# Patient Record
Sex: FEMALE | Race: OTHER | HISPANIC OR LATINO
[De-identification: names, ages, dates, MRNs, and addresses within clinical notes are randomized per-mention and may not be internally consistent; named-entity substitution may affect disease eponyms.]

---

## 2021-04-08 ENCOUNTER — APPOINTMENT (OUTPATIENT)
Dept: INTERNAL MEDICINE | Facility: CLINIC | Age: 41
End: 2021-04-08
Payer: MEDICAID

## 2021-04-08 ENCOUNTER — LABORATORY RESULT (OUTPATIENT)
Age: 41
End: 2021-04-08

## 2021-04-08 VITALS
TEMPERATURE: 98 F | WEIGHT: 136 LBS | DIASTOLIC BLOOD PRESSURE: 71 MMHG | RESPIRATION RATE: 16 BRPM | HEART RATE: 65 BPM | OXYGEN SATURATION: 97 % | SYSTOLIC BLOOD PRESSURE: 106 MMHG | BODY MASS INDEX: 27.42 KG/M2 | HEIGHT: 59 IN

## 2021-04-08 DIAGNOSIS — Z83.49 FAMILY HISTORY OF OTHER ENDOCRINE, NUTRITIONAL AND METABOLIC DISEASES: ICD-10-CM

## 2021-04-08 DIAGNOSIS — Z83.3 FAMILY HISTORY OF DIABETES MELLITUS: ICD-10-CM

## 2021-04-08 DIAGNOSIS — Z12.4 ENCOUNTER FOR SCREENING FOR MALIGNANT NEOPLASM OF CERVIX: ICD-10-CM

## 2021-04-08 DIAGNOSIS — Z78.9 OTHER SPECIFIED HEALTH STATUS: ICD-10-CM

## 2021-04-08 DIAGNOSIS — J30.2 OTHER SEASONAL ALLERGIC RHINITIS: ICD-10-CM

## 2021-04-08 PROCEDURE — 99204 OFFICE O/P NEW MOD 45 MIN: CPT

## 2021-04-08 PROCEDURE — 99072 ADDL SUPL MATRL&STAF TM PHE: CPT

## 2021-04-08 NOTE — HISTORY OF PRESENT ILLNESS
[FreeTextEntry1] : establish care [de-identified] : 40yoF,  applying for [facility], PMH LUTS, seasonal allergies presents to establish care\par \par urology - s/p electrostimulation\par \par ENT - allergy to dust\par otherwise workup wnl

## 2021-04-08 NOTE — HEALTH RISK ASSESSMENT
[] : No [No] : No [0] : 2) Feeling down, depressed, or hopeless: Not at all (0) [[id number]] : 0 [Patient reported PAP Smear was normal] : Patient reported PAP Smear was normal [PapSmearDate] : 2000 [PapSmearComments] : due

## 2021-04-09 ENCOUNTER — NON-APPOINTMENT (OUTPATIENT)
Age: 41
End: 2021-04-09

## 2021-04-13 LAB
ALBUMIN SERPL ELPH-MCNC: 4.2 G/DL
ALP BLD-CCNC: 82 U/L
ALT SERPL-CCNC: 18 U/L
ANION GAP SERPL CALC-SCNC: 11 MMOL/L
AST SERPL-CCNC: 28 U/L
BASOPHILS # BLD AUTO: 0.04 K/UL
BASOPHILS NFR BLD AUTO: 0.6 %
BILIRUB SERPL-MCNC: 0.3 MG/DL
BUN SERPL-MCNC: 10 MG/DL
CALCIUM SERPL-MCNC: 9.3 MG/DL
CHLORIDE SERPL-SCNC: 101 MMOL/L
CHOLEST SERPL-MCNC: 191 MG/DL
CO2 SERPL-SCNC: 27 MMOL/L
CREAT SERPL-MCNC: 0.78 MG/DL
EOSINOPHIL # BLD AUTO: 0.23 K/UL
EOSINOPHIL NFR BLD AUTO: 3.6 %
ESTIMATED AVERAGE GLUCOSE: 111 MG/DL
GLUCOSE SERPL-MCNC: 86 MG/DL
HBA1C MFR BLD HPLC: 5.5 %
HCT VFR BLD CALC: 37.3 %
HDLC SERPL-MCNC: 71 MG/DL
HGB BLD-MCNC: 11.8 G/DL
IMM GRANULOCYTES NFR BLD AUTO: 0.2 %
LDLC SERPL CALC-MCNC: 108 MG/DL
LYMPHOCYTES # BLD AUTO: 1.76 K/UL
LYMPHOCYTES NFR BLD AUTO: 27.2 %
MAN DIFF?: NORMAL
MCHC RBC-ENTMCNC: 30.6 PG
MCHC RBC-ENTMCNC: 31.6 GM/DL
MCV RBC AUTO: 96.9 FL
MONOCYTES # BLD AUTO: 0.4 K/UL
MONOCYTES NFR BLD AUTO: 6.2 %
NEUTROPHILS # BLD AUTO: 4.02 K/UL
NEUTROPHILS NFR BLD AUTO: 62.2 %
NONHDLC SERPL-MCNC: 120 MG/DL
PLATELET # BLD AUTO: 324 K/UL
POTASSIUM SERPL-SCNC: 4.5 MMOL/L
PROT SERPL-MCNC: 7.1 G/DL
RBC # BLD: 3.85 M/UL
RBC # FLD: 12.9 %
SODIUM SERPL-SCNC: 138 MMOL/L
THYROGLOB AB SERPL-ACNC: <20 IU/ML
THYROPEROXIDASE AB SERPL IA-ACNC: 971 IU/ML
TRIGL SERPL-MCNC: 62 MG/DL
TSH SERPL-ACNC: 5.84 UIU/ML
WBC # FLD AUTO: 6.46 K/UL

## 2021-04-22 ENCOUNTER — TRANSCRIPTION ENCOUNTER (OUTPATIENT)
Age: 41
End: 2021-04-22

## 2021-05-25 ENCOUNTER — APPOINTMENT (OUTPATIENT)
Dept: OBGYN | Facility: CLINIC | Age: 41
End: 2021-05-25
Payer: MEDICAID

## 2021-05-25 VITALS
TEMPERATURE: 97.9 F | WEIGHT: 134 LBS | BODY MASS INDEX: 27.01 KG/M2 | HEART RATE: 66 BPM | DIASTOLIC BLOOD PRESSURE: 51 MMHG | HEIGHT: 59 IN | SYSTOLIC BLOOD PRESSURE: 95 MMHG | OXYGEN SATURATION: 99 %

## 2021-05-25 PROCEDURE — 99385 PREV VISIT NEW AGE 18-39: CPT

## 2021-05-25 NOTE — HISTORY OF PRESENT ILLNESS
[FreeTextEntry1] : here for annual\par no complaints \par not sexually active currently \par reg menses \par

## 2021-05-27 LAB
C TRACH RRNA SPEC QL NAA+PROBE: NOT DETECTED
HPV HIGH+LOW RISK DNA PNL CVX: NOT DETECTED
N GONORRHOEA RRNA SPEC QL NAA+PROBE: NOT DETECTED
SOURCE TP AMPLIFICATION: NORMAL

## 2021-05-28 LAB — CYTOLOGY CVX/VAG DOC THIN PREP: ABNORMAL

## 2021-06-04 ENCOUNTER — APPOINTMENT (OUTPATIENT)
Dept: INTERNAL MEDICINE | Facility: CLINIC | Age: 41
End: 2021-06-04
Payer: MEDICAID

## 2021-06-04 VITALS
OXYGEN SATURATION: 96 % | HEART RATE: 77 BPM | RESPIRATION RATE: 16 BRPM | BODY MASS INDEX: 27.01 KG/M2 | HEIGHT: 59 IN | SYSTOLIC BLOOD PRESSURE: 111 MMHG | DIASTOLIC BLOOD PRESSURE: 68 MMHG | TEMPERATURE: 98 F | WEIGHT: 134 LBS

## 2021-06-04 PROCEDURE — 99214 OFFICE O/P EST MOD 30 MIN: CPT

## 2021-06-05 NOTE — PHYSICAL EXAM
[No Acute Distress] : no acute distress [Well Nourished] : well nourished [Well Developed] : well developed [Well-Appearing] : well-appearing [Normal Sclera/Conjunctiva] : normal sclera/conjunctiva [PERRL] : pupils equal round and reactive to light [EOMI] : extraocular movements intact [Normal Outer Ear/Nose] : the outer ears and nose were normal in appearance [Normal Oropharynx] : the oropharynx was normal [No JVD] : no jugular venous distention [No Lymphadenopathy] : no lymphadenopathy [Supple] : supple [Thyroid Normal, No Nodules] : the thyroid was normal and there were no nodules present [No Accessory Muscle Use] : no accessory muscle use [No Respiratory Distress] : no respiratory distress  [Clear to Auscultation] : lungs were clear to auscultation bilaterally [Normal Rate] : normal rate  [Regular Rhythm] : with a regular rhythm [Normal S1, S2] : normal S1 and S2 [No Murmur] : no murmur heard [No Carotid Bruits] : no carotid bruits [No Abdominal Bruit] : a ~M bruit was not heard ~T in the abdomen [No Varicosities] : no varicosities [No Edema] : there was no peripheral edema [Pedal Pulses Present] : the pedal pulses are present [No Palpable Aorta] : no palpable aorta [No Extremity Clubbing/Cyanosis] : no extremity clubbing/cyanosis [Soft] : abdomen soft [Non Tender] : non-tender [Non-distended] : non-distended [No Masses] : no abdominal mass palpated [No HSM] : no HSM [Normal Bowel Sounds] : normal bowel sounds [Normal Posterior Cervical Nodes] : no posterior cervical lymphadenopathy [Normal Anterior Cervical Nodes] : no anterior cervical lymphadenopathy [No CVA Tenderness] : no CVA  tenderness [No Spinal Tenderness] : no spinal tenderness [No Joint Swelling] : no joint swelling [Grossly Normal Strength/Tone] : grossly normal strength/tone [No Rash] : no rash [Coordination Grossly Intact] : coordination grossly intact [No Focal Deficits] : no focal deficits [Normal Gait] : normal gait [Deep Tendon Reflexes (DTR)] : deep tendon reflexes were 2+ and symmetric [Normal Affect] : the affect was normal [Normal Insight/Judgement] : insight and judgment were intact

## 2021-06-05 NOTE — HISTORY OF PRESENT ILLNESS
[FreeTextEntry1] : thyroid [de-identified] : 40yoF, , PMH hypothyroidism, LUTS, seasonal allergies presents for follow up\par \par unable to apply for [facility] due to hypothyroidism\par \par adherent to synthroid\par endorses resolution b/l wrist pain

## 2021-06-07 ENCOUNTER — TRANSCRIPTION ENCOUNTER (OUTPATIENT)
Age: 41
End: 2021-06-07

## 2021-06-07 LAB
T4 FREE SERPL-MCNC: 0.9 NG/DL
TSH SERPL-ACNC: 3.13 UIU/ML

## 2021-08-05 ENCOUNTER — LABORATORY RESULT (OUTPATIENT)
Age: 41
End: 2021-08-05

## 2021-08-05 ENCOUNTER — APPOINTMENT (OUTPATIENT)
Dept: INTERNAL MEDICINE | Facility: CLINIC | Age: 41
End: 2021-08-05
Payer: MEDICAID

## 2021-08-05 VITALS
WEIGHT: 135 LBS | OXYGEN SATURATION: 99 % | RESPIRATION RATE: 16 BRPM | BODY MASS INDEX: 27.21 KG/M2 | SYSTOLIC BLOOD PRESSURE: 99 MMHG | TEMPERATURE: 97.9 F | HEART RATE: 65 BPM | HEIGHT: 59 IN | DIASTOLIC BLOOD PRESSURE: 66 MMHG

## 2021-08-05 DIAGNOSIS — F41.9 ANXIETY DISORDER, UNSPECIFIED: ICD-10-CM

## 2021-08-05 PROCEDURE — 99214 OFFICE O/P EST MOD 30 MIN: CPT

## 2021-08-05 NOTE — HISTORY OF PRESENT ILLNESS
[FreeTextEntry1] : hypothyroidism [de-identified] : 41yoF, , PMH hypothyroidism, LUTS, seasonal allergies presents for follow up\par \par a lot of stress as single mother\par does not have employable skill\par \par last saw psychiatrist 2 mos ago\par had been on SSRI but stopped

## 2021-08-10 LAB — TSH SERPL-ACNC: 5.41 UIU/ML

## 2021-11-16 LAB — TSH SERPL-ACNC: 3.96 UIU/ML

## 2021-12-03 ENCOUNTER — APPOINTMENT (OUTPATIENT)
Dept: ENDOCRINOLOGY | Facility: CLINIC | Age: 41
End: 2021-12-03
Payer: MEDICAID

## 2021-12-03 VITALS
DIASTOLIC BLOOD PRESSURE: 54 MMHG | OXYGEN SATURATION: 98 % | WEIGHT: 135 LBS | BODY MASS INDEX: 25.82 KG/M2 | HEART RATE: 75 BPM | SYSTOLIC BLOOD PRESSURE: 89 MMHG | HEIGHT: 60.63 IN | TEMPERATURE: 98.9 F

## 2021-12-03 PROCEDURE — 99203 OFFICE O/P NEW LOW 30 MIN: CPT

## 2021-12-06 NOTE — ASSESSMENT
[FreeTextEntry1] : This is a 41-year-old female with primary hypothyroidism, Hashimoto's thyroiditis, here for consultation.\par She was diagnosed with hypothyroidism approximately age 40.\par She is currently on levothyroxine 25 mcg daily.\par She is not currently trying to conceive.\par Thyroid ultrasound from July 2021 showed no thyroid nodules.\par TSH from November 2021 was 3.96.  Continue levothyroxine 25 mcg daily.\par She is clinically euthyroid.\par

## 2021-12-06 NOTE — PHYSICAL EXAM
[Alert] : alert [Well Nourished] : well nourished [Healthy Appearance] : healthy appearance [No Acute Distress] : no acute distress [Well Developed] : well developed [Normal Voice/Communication] : normal voice communication [Normal Sclera/Conjunctiva] : normal sclera/conjunctiva [No Proptosis] : no proptosis [No Neck Mass] : no neck mass was observed [No LAD] : no lymphadenopathy [Supple] : the neck was supple [Thyroid Not Enlarged] : the thyroid was not enlarged [No Thyroid Nodules] : no palpable thyroid nodules [No Respiratory Distress] : no respiratory distress [Normal Rate] : heart rate was normal [Normal Affect] : the affect was normal [Normal Insight/Judgement] : insight and judgment were intact [Normal Mood] : the mood was normal

## 2021-12-06 NOTE — HISTORY OF PRESENT ILLNESS
[FreeTextEntry1] : CC: Hypothyroidism\par This is a 41-year-old female with primary hypothyroidism, Hashimoto's thyroiditis, here for consultation.\par She was diagnosed with hypothyroidism approximately age 40.\par She is currently on levothyroxine 25 mcg daily.\par She is not currently trying to conceive.\par Thyroid ultrasound from July 2021 showed no thyroid nodules.\par TSH from November 2021 was normal.\par

## 2021-12-17 ENCOUNTER — NON-APPOINTMENT (OUTPATIENT)
Age: 41
End: 2021-12-17

## 2021-12-18 ENCOUNTER — APPOINTMENT (OUTPATIENT)
Dept: INTERNAL MEDICINE | Facility: CLINIC | Age: 41
End: 2021-12-18
Payer: MEDICAID

## 2021-12-18 VITALS
TEMPERATURE: 98 F | SYSTOLIC BLOOD PRESSURE: 99 MMHG | BODY MASS INDEX: 25.52 KG/M2 | DIASTOLIC BLOOD PRESSURE: 62 MMHG | OXYGEN SATURATION: 97 % | HEART RATE: 76 BPM | RESPIRATION RATE: 16 BRPM | HEIGHT: 60 IN | WEIGHT: 130 LBS

## 2021-12-18 DIAGNOSIS — L25.9 UNSPECIFIED CONTACT DERMATITIS, UNSPECIFIED CAUSE: ICD-10-CM

## 2021-12-18 PROCEDURE — G0008: CPT

## 2021-12-18 PROCEDURE — 90686 IIV4 VACC NO PRSV 0.5 ML IM: CPT

## 2021-12-18 PROCEDURE — 99214 OFFICE O/P EST MOD 30 MIN: CPT | Mod: 25

## 2021-12-18 RX ORDER — OXYBUTYNIN CHLORIDE 5 MG/1
5 TABLET, EXTENDED RELEASE ORAL
Qty: 30 | Refills: 0 | Status: DISCONTINUED | COMMUNITY
Start: 2021-10-11 | End: 2021-12-18

## 2021-12-18 RX ORDER — L.ACID,FERM,PLA,RHA/B.BIF,LONG 126 MG
TABLET, DELAYED AND EXTENDED RELEASE ORAL
Refills: 0 | Status: ACTIVE | COMMUNITY
Start: 2021-12-18

## 2021-12-18 NOTE — HISTORY OF PRESENT ILLNESS
[FreeTextEntry1] : hypothyroidism  [de-identified] : 41yoF, , PMH hypothyroidism, LUTS, seasonal allergies presents for follow up\par \par s/p urethral neuromodulation at [facility]\par urinary incontinence resolved\par \par feels better, less anxiety\par helping daughter navigate social anxiety\par \par reviewed recent labs - TSH within range\par \par endorses itchy rash left inner thigh after using urinary pads\par endorses 'pimple' near vagina - no history genital herpes - will f/u GYN for exam

## 2021-12-31 NOTE — REVIEW OF SYSTEMS
Subjective     Jocelyn Kamara is a 48 y.o. female who presents with Dysuria (x2-3 days, possible UTI, burning with urination ) and Urinary Frequency (going all the time )            Dysuria   This is a new problem. Episode onset: pt reports new onset of burning with urination and sense of urinary urgency that started 2 days ago. No fever or chills. No abd pain. The problem has been gradually worsening. The quality of the pain is described as burning. Associated symptoms include urgency. Pertinent negatives include no flank pain. She has tried increased fluids for the symptoms. The treatment provided no relief.       Review of Systems   Genitourinary: Positive for dysuria and urgency. Negative for flank pain.   All other systems reviewed and are negative.         Past Medical History:   Diagnosis Date   • Myofascial pain       Past Surgical History:   Procedure Laterality Date   • OVARIAN CYSTECTOMY     • PRIMARY C SECTION        Social History     Socioeconomic History   • Marital status: Unknown     Spouse name: Not on file   • Number of children: Not on file   • Years of education: Not on file   • Highest education level: Not on file   Occupational History   • Not on file   Tobacco Use   • Smoking status: Never Smoker   • Smokeless tobacco: Never Used   Vaping Use   • Vaping Use: Never used   Substance and Sexual Activity   • Alcohol use: Yes     Comment: rarely    • Drug use: Never   • Sexual activity: Not on file   Other Topics Concern   • Not on file   Social History Narrative   • Not on file     Social Determinants of Health     Financial Resource Strain:    • Difficulty of Paying Living Expenses: Not on file   Food Insecurity:    • Worried About Running Out of Food in the Last Year: Not on file   • Ran Out of Food in the Last Year: Not on file   Transportation Needs:    • Lack of Transportation (Medical): Not on file   • Lack of Transportation (Non-Medical): Not on file   Physical Activity:    •  "Days of Exercise per Week: Not on file   • Minutes of Exercise per Session: Not on file   Stress:    • Feeling of Stress : Not on file   Social Connections:    • Frequency of Communication with Friends and Family: Not on file   • Frequency of Social Gatherings with Friends and Family: Not on file   • Attends [employer] Services: Not on file   • Active Member of Clubs or Organizations: Not on file   • Attends Club or Organization Meetings: Not on file   • Marital Status: Not on file   Intimate Partner Violence:    • Fear of Current or Ex-Partner: Not on file   • Emotionally Abused: Not on file   • Physically Abused: Not on file   • Sexually Abused: Not on file   Housing Stability:    • Unable to Pay for Housing in the Last Year: Not on file   • Number of Places Lived in the Last Year: Not on file   • Unstable Housing in the Last Year: Not on file         Objective     /58   Pulse 77   Temp 36.2 °C (97.2 °F) (Temporal)   Resp 16   Ht 1.6 m (5' 3\")   Wt 54.4 kg (120 lb)   LMP 12/09/2021 (Approximate)   SpO2 99%   Breastfeeding No   BMI 21.26 kg/m²      Physical Exam  Vitals and nursing note reviewed.   Constitutional:       Appearance: Normal appearance. She is normal weight.   HENT:      Head: Normocephalic and atraumatic.      Nose: Nose normal.      Mouth/Throat:      Mouth: Mucous membranes are moist.      Pharynx: Oropharynx is clear.   Eyes:      Extraocular Movements: Extraocular movements intact.      Pupils: Pupils are equal, round, and reactive to light.   Cardiovascular:      Rate and Rhythm: Normal rate and regular rhythm.   Pulmonary:      Effort: Pulmonary effort is normal.   Abdominal:      Tenderness: There is no right CVA tenderness or left CVA tenderness.   Musculoskeletal:         General: Normal range of motion.      Cervical back: Normal range of motion.   Skin:     General: Skin is warm and dry.      Capillary Refill: Capillary refill takes less than 2 seconds.   Neurological:      " General: No focal deficit present.      Mental Status: She is alert and oriented to person, place, and time. Mental status is at baseline.   Psychiatric:         Mood and Affect: Mood normal.         Speech: Speech normal.         Thought Content: Thought content normal.         Judgment: Judgment normal.                  Lab Results   Component Value Date/Time    POCCOLOR Orange 12/31/2021 11:27 AM    POCAPPEAR Clear 12/31/2021 11:27 AM    POCLEUKEST Neg 12/31/2021 11:27 AM    POCNITRITE Neg 12/31/2021 11:27 AM    POCUROBILIGE 0.2 12/31/2021 11:27 AM    POCPROTEIN Neg 12/31/2021 11:27 AM    POCURPH 7.0 12/31/2021 11:27 AM    POCBLOOD Small 12/31/2021 11:27 AM    POCSPGRV 1.020 12/31/2021 11:27 AM    POCKETONES Neg 12/31/2021 11:27 AM    POCBILIRUBIN Neg 12/31/2021 11:27 AM    POCGLUCUA Neg 12/31/2021 11:27 AM                 Assessment & Plan        1. Dysuria  - POCT Urinalysis    2. Urinary tract infection with hematuria, site unspecified  - Urine Culture; Future  - nitrofurantoin (MACROBID) 100 MG Cap; Take 1 Capsule by mouth every 12 hours for 5 days.  Dispense: 10 Capsule; Refill: 0  - phenazopyridine (PYRIDIUM) 200 MG Tab; Take 1 Tablet by mouth 3 times a day for 2 days.  Dispense: 6 Tablet; Refill: 0    Take full course of abx  Will contact her if I need to change abx based on cx results  Increase water intake  Tylenol and ibuprofen as needed for pain  Supportive care, differential diagnoses, and indications for immediate follow-up discussed with patient.    Pathogenesis of diagnosis discussed including typical length and natural progression.      Instructed to return to  or nearest emergency department if symptoms fail to improve, for any change in condition, further concerns, or new concerning symptoms.  Patient states understanding of the plan of care and discharge instructions.               [Negative] : Heme/Lymph

## 2022-02-08 ENCOUNTER — APPOINTMENT (OUTPATIENT)
Dept: OBGYN | Facility: CLINIC | Age: 42
End: 2022-02-08
Payer: MEDICAID

## 2022-02-08 VITALS
TEMPERATURE: 98 F | HEIGHT: 60 IN | OXYGEN SATURATION: 98 % | WEIGHT: 134 LBS | DIASTOLIC BLOOD PRESSURE: 63 MMHG | BODY MASS INDEX: 26.31 KG/M2 | HEART RATE: 69 BPM | SYSTOLIC BLOOD PRESSURE: 98 MMHG

## 2022-02-08 PROCEDURE — 99214 OFFICE O/P EST MOD 30 MIN: CPT

## 2022-03-15 ENCOUNTER — NON-APPOINTMENT (OUTPATIENT)
Age: 42
End: 2022-03-15

## 2022-04-16 ENCOUNTER — APPOINTMENT (OUTPATIENT)
Dept: INTERNAL MEDICINE | Facility: CLINIC | Age: 42
End: 2022-04-16

## 2022-04-25 ENCOUNTER — LABORATORY RESULT (OUTPATIENT)
Age: 42
End: 2022-04-25

## 2022-05-03 ENCOUNTER — NON-APPOINTMENT (OUTPATIENT)
Age: 42
End: 2022-05-03

## 2022-05-03 LAB
ALBUMIN SERPL ELPH-MCNC: 4.1 G/DL
ALP BLD-CCNC: 81 U/L
ALT SERPL-CCNC: 20 U/L
ANION GAP SERPL CALC-SCNC: 12 MMOL/L
AST SERPL-CCNC: 25 U/L
BASOPHILS # BLD AUTO: 0.04 K/UL
BASOPHILS NFR BLD AUTO: 0.7 %
BILIRUB SERPL-MCNC: 0.2 MG/DL
BUN SERPL-MCNC: 12 MG/DL
CALCIUM SERPL-MCNC: 8.9 MG/DL
CHLORIDE SERPL-SCNC: 103 MMOL/L
CHOLEST SERPL-MCNC: 196 MG/DL
CO2 SERPL-SCNC: 25 MMOL/L
CREAT SERPL-MCNC: 0.68 MG/DL
EGFR: 113 ML/MIN/1.73M2
EOSINOPHIL # BLD AUTO: 0.23 K/UL
EOSINOPHIL NFR BLD AUTO: 4 %
ESTIMATED AVERAGE GLUCOSE: 120 MG/DL
GLUCOSE SERPL-MCNC: 90 MG/DL
HBA1C MFR BLD HPLC: 5.8 %
HCT VFR BLD CALC: 35.8 %
HDLC SERPL-MCNC: 76 MG/DL
HGB BLD-MCNC: 11.1 G/DL
IMM GRANULOCYTES NFR BLD AUTO: 0.3 %
LDLC SERPL CALC-MCNC: 109 MG/DL
LYMPHOCYTES # BLD AUTO: 1.65 K/UL
LYMPHOCYTES NFR BLD AUTO: 28.4 %
MAN DIFF?: NORMAL
MCHC RBC-ENTMCNC: 30.2 PG
MCHC RBC-ENTMCNC: 31 GM/DL
MCV RBC AUTO: 97.3 FL
MONOCYTES # BLD AUTO: 0.34 K/UL
MONOCYTES NFR BLD AUTO: 5.9 %
NEUTROPHILS # BLD AUTO: 3.53 K/UL
NEUTROPHILS NFR BLD AUTO: 60.7 %
NONHDLC SERPL-MCNC: 120 MG/DL
PLATELET # BLD AUTO: 315 K/UL
POTASSIUM SERPL-SCNC: 4.3 MMOL/L
PROT SERPL-MCNC: 6.9 G/DL
RBC # BLD: 3.68 M/UL
RBC # FLD: 13.3 %
SODIUM SERPL-SCNC: 139 MMOL/L
TRIGL SERPL-MCNC: 56 MG/DL
TSH SERPL-ACNC: 6.42 UIU/ML
WBC # FLD AUTO: 5.81 K/UL

## 2022-05-31 ENCOUNTER — APPOINTMENT (OUTPATIENT)
Dept: OBGYN | Facility: CLINIC | Age: 42
End: 2022-05-31
Payer: MEDICAID

## 2022-05-31 VITALS
TEMPERATURE: 98.1 F | HEIGHT: 60 IN | SYSTOLIC BLOOD PRESSURE: 102 MMHG | DIASTOLIC BLOOD PRESSURE: 66 MMHG | WEIGHT: 128 LBS | OXYGEN SATURATION: 98 % | HEART RATE: 72 BPM | BODY MASS INDEX: 25.13 KG/M2

## 2022-05-31 DIAGNOSIS — N89.8 OTHER SPECIFIED NONINFLAMMATORY DISORDERS OF VAGINA: ICD-10-CM

## 2022-05-31 DIAGNOSIS — Z01.419 ENCOUNTER FOR GYNECOLOGICAL EXAMINATION (GENERAL) (ROUTINE) W/OUT ABNORMAL FINDINGS: ICD-10-CM

## 2022-05-31 PROCEDURE — 99396 PREV VISIT EST AGE 40-64: CPT

## 2022-06-02 LAB
CANDIDA VAG CYTO: NOT DETECTED
G VAGINALIS+PREV SP MTYP VAG QL MICRO: DETECTED
T VAGINALIS VAG QL WET PREP: NOT DETECTED

## 2022-06-03 ENCOUNTER — APPOINTMENT (OUTPATIENT)
Dept: ENDOCRINOLOGY | Facility: CLINIC | Age: 42
End: 2022-06-03
Payer: MEDICAID

## 2022-06-03 DIAGNOSIS — E06.3 AUTOIMMUNE THYROIDITIS: ICD-10-CM

## 2022-06-03 PROCEDURE — 99212 OFFICE O/P EST SF 10 MIN: CPT | Mod: 95

## 2022-06-03 NOTE — REVIEW OF SYSTEMS
[Fatigue] : fatigue [Recent Weight Loss (___ Lbs)] : recent weight loss: [unfilled] lbs [Acne] : acne [All other systems negative] : All other systems negative

## 2022-06-03 NOTE — ASSESSMENT
[FreeTextEntry1] : This is a 41-year-old female with primary hypothyroidism, Hashimoto's thyroiditis, anemia, possible prediabetes.\par She was diagnosed with hypothyroidism approximately age 40.\par She is currently on levothyroxine 25 mcg daily.  TSH was found to be 6.42 in April 2022.  Increase levothyroxine to 50 mcg daily.  She states she will take 2 tablets of 25 mcg daily.\par Check TFTs in 6 weeks.\par She is not currently trying to conceive.\par Thyroid ultrasound from July 2021 showed no thyroid nodules.\par Recent hemoglobin A1c showed prediabetes however she has anemia.  Will check fructosamine with next blood work.\par

## 2022-06-03 NOTE — REASON FOR VISIT
[Home] : at home, [unfilled] , at the time of the visit. [Medical Office: ([facility])___] : at the medical office located in  [Patient] : the patient [Follow - Up] : a follow-up visit [Hypothyroidism] : hypothyroidism

## 2022-06-03 NOTE — HISTORY OF PRESENT ILLNESS
[FreeTextEntry1] : CC: Hypothyroidism\par This is a 41-year-old female with primary hypothyroidism, Hashimoto's thyroiditis, anemia, possible prediabetes. \par She was diagnosed with hypothyroidism approximately age 40.\par She is currently on levothyroxine 25 mcg daily. Recent TSH from 4/2022 was 6.42. \par She is not currently trying to conceive.\par Thyroid ultrasound from July 2021 showed no thyroid nodules.\par \par

## 2022-09-27 ENCOUNTER — EMERGENCY (EMERGENCY)
Facility: HOSPITAL | Age: 42
LOS: 1 days | Discharge: HOME | End: 2022-09-27
Attending: EMERGENCY MEDICINE
Payer: COMMERCIAL

## 2022-09-27 VITALS
RESPIRATION RATE: 18 BRPM | HEART RATE: 73 BPM | SYSTOLIC BLOOD PRESSURE: 121 MMHG | OXYGEN SATURATION: 98 % | WEIGHT: 138.01 LBS | DIASTOLIC BLOOD PRESSURE: 80 MMHG | TEMPERATURE: 98 F

## 2022-09-27 LAB
APPEARANCE UR: CLEAR — SIGNIFICANT CHANGE UP
BILIRUB UR-MCNC: NEGATIVE — SIGNIFICANT CHANGE UP
COLOR SPEC: YELLOW — SIGNIFICANT CHANGE UP
DIFF PNL FLD: ABNORMAL
GLUCOSE UR QL: NEGATIVE — SIGNIFICANT CHANGE UP
HIV 1 & 2 AB SERPL IA.RAPID: SIGNIFICANT CHANGE UP
KETONES UR-MCNC: NEGATIVE — SIGNIFICANT CHANGE UP
LEUKOCYTE ESTERASE UR-ACNC: ABNORMAL
NITRITE UR-MCNC: NEGATIVE — SIGNIFICANT CHANGE UP
PH UR: 7 — SIGNIFICANT CHANGE UP (ref 5–8)
PROT UR-MCNC: 30 MG/DL
SP GR SPEC: 1.01 — SIGNIFICANT CHANGE UP (ref 1.01–1.02)
UROBILINOGEN FLD QL: NEGATIVE — SIGNIFICANT CHANGE UP

## 2022-09-27 PROCEDURE — 87798 DETECT AGENT NOS DNA AMP: CPT

## 2022-09-27 PROCEDURE — 36415 COLL VENOUS BLD VENIPUNCTURE: CPT

## 2022-09-27 PROCEDURE — 99284 EMERGENCY DEPT VISIT MOD MDM: CPT

## 2022-09-27 PROCEDURE — 81001 URINALYSIS AUTO W/SCOPE: CPT

## 2022-09-27 PROCEDURE — 87491 CHLMYD TRACH DNA AMP PROBE: CPT

## 2022-09-27 PROCEDURE — 86780 TREPONEMA PALLIDUM: CPT

## 2022-09-27 PROCEDURE — 99283 EMERGENCY DEPT VISIT LOW MDM: CPT

## 2022-09-27 PROCEDURE — 87529 HSV DNA AMP PROBE: CPT

## 2022-09-27 PROCEDURE — 86703 HIV-1/HIV-2 1 RESULT ANTBDY: CPT

## 2022-09-27 PROCEDURE — 87591 N.GONORRHOEAE DNA AMP PROB: CPT

## 2022-09-27 PROCEDURE — 87086 URINE CULTURE/COLONY COUNT: CPT

## 2022-09-27 NOTE — ED PROVIDER NOTE - OBJECTIVE STATEMENT
Pt is a 41 yo lady with a pmhx of Hashimoto's who presents to the ED with vaginal irritation. Has had symptoms for 2 weeks, initially treated for yeast infection by ob/gyn, but symptoms remained after 3 and 7 day treatments. No fever, no discharge. Has some dysuria. Has hx of chlamydia 3 yrs ago which was treated. No recent sexual partners. No flank pain, no jesenia hematuria. LMP 9/1.

## 2022-09-27 NOTE — ED PROVIDER NOTE - NSFOLLOWUPCLINICS_GEN_ALL_ED_FT
Mauro STARK  [facility]  [address] [place] [address]  Phone: [phone]  Fax: [phone]  Follow Up Time: 4-6 Days

## 2022-09-27 NOTE — ED PROVIDER NOTE - CLINICAL SUMMARY MEDICAL DECISION MAKING FREE TEXT BOX
Ddx: Vaginal irritation/ no evidence of blisters or discharge/ ro STI  Plan: Ua, ucx, sti testing, d/c to fu w ob/gyn.

## 2022-09-27 NOTE — ED PROVIDER NOTE - PATIENT PORTAL LINK FT
You can access the FollowMyHealth Patient Portal offered by [facility] by registering at the following website: [email or web address]. By joining [employer]’s FollowMyHealth portal, you will also be able to view your health information using other applications (apps) compatible with our system.

## 2022-09-27 NOTE — ED ADULT NURSE NOTE - OBJECTIVE STATEMENT
c/o vaginal discomfort with lesions x 2 weeks not improved on intravaginal abx prescribed by her OBGYN. c/o burning with urination. No fever.

## 2022-09-28 ENCOUNTER — APPOINTMENT (OUTPATIENT)
Dept: OBGYN | Facility: CLINIC | Age: 42
End: 2022-09-28

## 2022-09-28 LAB
C TRACH RRNA SPEC QL NAA+PROBE: SIGNIFICANT CHANGE UP
HERPES SIMPLEX VIRUS 1/2 SURVEILLANCE PCR RESULT: SIGNIFICANT CHANGE UP
HERPES SIMPLEX VIRUS 1/2 SURVEILLANCE PCR SOURCE: SIGNIFICANT CHANGE UP
HSV1+2 DNA SPEC QL NAA+PROBE: SIGNIFICANT CHANGE UP
N GONORRHOEA RRNA SPEC QL NAA+PROBE: SIGNIFICANT CHANGE UP
SPECIMEN SOURCE: SIGNIFICANT CHANGE UP
T PALLIDUM AB TITR SER: NEGATIVE — SIGNIFICANT CHANGE UP

## 2022-09-29 LAB
CULTURE RESULTS: SIGNIFICANT CHANGE UP
SPECIMEN SOURCE: SIGNIFICANT CHANGE UP

## 2022-10-09 NOTE — HISTORY OF PRESENT ILLNESS
[de-identified] : 41yoF, , PMH hypothyroidism, LUTS, seasonal allergies presents for follow up\par \par a lot of stress as single mother\par does not have employable skill\par \par last saw psychiatrist 2 mos ago\par had been on SSRI but stopped

## 2022-10-10 ENCOUNTER — APPOINTMENT (OUTPATIENT)
Dept: INTERNAL MEDICINE | Facility: CLINIC | Age: 42
End: 2022-10-10

## 2022-11-11 ENCOUNTER — APPOINTMENT (OUTPATIENT)
Dept: INTERNAL MEDICINE | Facility: CLINIC | Age: 42
End: 2022-11-11

## 2022-11-11 PROCEDURE — 99442: CPT

## 2022-11-11 RX ORDER — LEVOTHYROXINE SODIUM 0.03 MG/1
25 TABLET ORAL
Qty: 30 | Refills: 0 | Status: DISCONTINUED | COMMUNITY
Start: 2021-12-18

## 2022-11-13 NOTE — HISTORY OF PRESENT ILLNESS
[Home] : at home, [unfilled] , at the time of the visit. [Medical Office: ([facility])___] : at the medical office located in  [Verbal consent obtained from patient] : the patient, [unfilled] [FreeTextEntry1] : review meds [de-identified] : 42yoF, , PMH hypothyroidism, LUTS, seasonal allergies presents for follow up\par \par a lot of stress as single mother\par does not have employable skill\par \par last saw psychiatrist 2 mos ago\par had been on SSRI but stopped\par \par 11/11:\par needs LT4 refill, confirmed 50ug daily, but patient did not complete recent TFTs

## 2022-11-16 ENCOUNTER — APPOINTMENT (OUTPATIENT)
Dept: OBGYN | Facility: CLINIC | Age: 42
End: 2022-11-16

## 2022-11-16 VITALS
HEIGHT: 60 IN | DIASTOLIC BLOOD PRESSURE: 68 MMHG | TEMPERATURE: 98.2 F | HEART RATE: 75 BPM | BODY MASS INDEX: 27.09 KG/M2 | SYSTOLIC BLOOD PRESSURE: 97 MMHG | OXYGEN SATURATION: 98 % | WEIGHT: 138 LBS

## 2022-11-16 DIAGNOSIS — L29.2 PRURITUS VULVAE: ICD-10-CM

## 2022-11-16 DIAGNOSIS — L90.0 LICHEN SCLEROSUS ET ATROPHICUS: ICD-10-CM

## 2022-11-16 PROCEDURE — 99214 OFFICE O/P EST MOD 30 MIN: CPT

## 2022-11-16 RX ORDER — CLOBETASOL PROPIONATE 0.5 MG/G
0.05 CREAM TOPICAL
Qty: 1 | Refills: 3 | Status: COMPLETED | COMMUNITY
Start: 2022-02-08 | End: 2022-11-16

## 2022-11-16 RX ORDER — METRONIDAZOLE 500 MG/1
500 TABLET ORAL TWICE DAILY
Qty: 14 | Refills: 0 | Status: COMPLETED | COMMUNITY
Start: 2022-06-02 | End: 2022-11-16

## 2022-11-16 RX ORDER — METRONIDAZOLE 7.5 MG/G
0.75 GEL VAGINAL
Qty: 1 | Refills: 0 | Status: COMPLETED | COMMUNITY
Start: 2022-02-08 | End: 2022-11-16

## 2022-11-16 NOTE — HISTORY OF PRESENT ILLNESS
[Normal Amount/Duration] :  normal amount and duration [Regular Cycle Intervals] : periods have been regular [Menarche Age: ____] : age at menarche was [unfilled] [FreeTextEntry1] : 11/04/2022 [Previously active] : previously active [Men] : men [Vaginal] : vaginal [No] : No

## 2022-11-16 NOTE — PHYSICAL EXAM
[Chaperone Present] : A chaperone was present in the examining room during all aspects of the physical examination [Appropriately responsive] : appropriately responsive [Alert] : alert [No Acute Distress] : no acute distress [No Lymphadenopathy] : no lymphadenopathy [Regular Rate Rhythm] : regular rate rhythm [No Murmurs] : no murmurs [Clear to Auscultation B/L] : clear to auscultation bilaterally [Soft] : soft [Non-tender] : non-tender [Non-distended] : non-distended [No HSM] : No HSM [No Lesions] : no lesions [No Mass] : no mass [Oriented x3] : oriented x3 [Examination Of The Breasts] : a normal appearance [No Masses] : no breast masses were palpable [Location: ___] : [unfilled] [(Area # ___ on Diagram)] : (area  #[unfilled] on diagram) [Discharge] : discharge [Scant] : scant [Foul Smelling] : not foul smelling [White] : white [Thick] : thick [Normal] : normal [Uterine Adnexae] : normal

## 2022-11-16 NOTE — PROCEDURE
[Vulvar Biopsy] : Vulvar Biopsy [] : on the right labia majora [Size of Biopsy Taken: ___ (mm)] : [unfilled]Umm [Local Anesthesia] : local anesthesia [____ Lidocaine w/o Epi] : ~VmL lidocaine without epinephrine [Betadine] : Betadine [Sent to Pathology] : placed in buffered formalin and sent for pathology [Punch] : punch biopsy [Silver Nitrate] : silver nitrate [Tolerated Well] : the patient tolerated the procedure well [No Complications] : there were no complications

## 2022-11-18 LAB
C TRACH RRNA SPEC QL NAA+PROBE: NOT DETECTED
CANDIDA VAG CYTO: NOT DETECTED
ESTIMATED AVERAGE GLUCOSE: 123 MG/DL
G VAGINALIS+PREV SP MTYP VAG QL MICRO: NOT DETECTED
HBA1C MFR BLD HPLC: 5.9 %
N GONORRHOEA RRNA SPEC QL NAA+PROBE: NOT DETECTED
SOURCE AMPLIFICATION: NORMAL
T VAGINALIS VAG QL WET PREP: NOT DETECTED
T3FREE SERPL-MCNC: 2.54 PG/ML
T4 FREE SERPL-MCNC: 1 NG/DL
TSH SERPL-ACNC: 3.38 UIU/ML

## 2022-11-21 LAB — CORE LAB BIOPSY: NORMAL

## 2023-01-19 NOTE — HISTORY OF PRESENT ILLNESS
[de-identified] : 42yoF, , PMH hypothyroidism, LUTS, seasonal allergies presents for follow up\par \par a lot of stress as single mother\par does not have employable skill\par \par last saw psychiatrist 2 mos ago\par had been on SSRI but stopped\par \par 11/11:\par needs LT4 refill, confirmed 50ug daily, but patient did not complete recent TFTs

## 2023-01-20 ENCOUNTER — APPOINTMENT (OUTPATIENT)
Dept: INTERNAL MEDICINE | Facility: CLINIC | Age: 43
End: 2023-01-20

## 2023-03-17 ENCOUNTER — NON-APPOINTMENT (OUTPATIENT)
Age: 43
End: 2023-03-17

## 2023-03-17 ENCOUNTER — APPOINTMENT (OUTPATIENT)
Dept: OPHTHALMOLOGY | Facility: CLINIC | Age: 43
End: 2023-03-17
Payer: COMMERCIAL

## 2023-03-17 PROCEDURE — 92004 COMPRE OPH EXAM NEW PT 1/>: CPT

## 2023-04-24 ENCOUNTER — APPOINTMENT (OUTPATIENT)
Dept: INTERNAL MEDICINE | Facility: CLINIC | Age: 43
End: 2023-04-24

## 2023-05-28 ENCOUNTER — NON-APPOINTMENT (OUTPATIENT)
Age: 43
End: 2023-05-28

## 2023-06-02 ENCOUNTER — APPOINTMENT (OUTPATIENT)
Dept: OPHTHALMOLOGY | Facility: CLINIC | Age: 43
End: 2023-06-02

## 2023-06-06 ENCOUNTER — APPOINTMENT (OUTPATIENT)
Dept: OBGYN | Facility: CLINIC | Age: 43
End: 2023-06-06

## 2023-08-04 ENCOUNTER — APPOINTMENT (OUTPATIENT)
Dept: INTERNAL MEDICINE | Facility: CLINIC | Age: 43
End: 2023-08-04
Payer: COMMERCIAL

## 2023-08-04 VITALS
HEART RATE: 99 BPM | TEMPERATURE: 97.7 F | RESPIRATION RATE: 16 BRPM | OXYGEN SATURATION: 98 % | SYSTOLIC BLOOD PRESSURE: 108 MMHG | BODY MASS INDEX: 27.48 KG/M2 | WEIGHT: 140 LBS | HEIGHT: 60 IN | DIASTOLIC BLOOD PRESSURE: 75 MMHG

## 2023-08-04 DIAGNOSIS — R73.03 PREDIABETES.: ICD-10-CM

## 2023-08-04 PROCEDURE — 99396 PREV VISIT EST AGE 40-64: CPT

## 2023-08-04 RX ORDER — HYDROCORTISONE 0.5 G/100G
0.5 CREAM TOPICAL TWICE DAILY
Qty: 1 | Refills: 0 | Status: DISCONTINUED | COMMUNITY
Start: 2022-02-08 | End: 2023-08-04

## 2023-08-18 NOTE — HISTORY OF PRESENT ILLNESS
[de-identified] : 42yoF, , PMH hypothyroidism, LUTS, seasonal allergies presents for follow up\par  \par  a lot of stress as single mother\par  does not have employable skill\par  \par  last saw psychiatrist 2 mos ago\par  had been on SSRI but stopped\par  \par  11/11:\par  needs LT4 refill, confirmed 50ug daily, but patient did not complete recent TFTs

## 2023-08-28 ENCOUNTER — APPOINTMENT (OUTPATIENT)
Dept: UROGYNECOLOGY | Facility: CLINIC | Age: 43
End: 2023-08-28
Payer: COMMERCIAL

## 2023-08-28 VITALS
WEIGHT: 141 LBS | BODY MASS INDEX: 28.43 KG/M2 | HEART RATE: 81 BPM | SYSTOLIC BLOOD PRESSURE: 104 MMHG | HEIGHT: 59 IN | DIASTOLIC BLOOD PRESSURE: 69 MMHG

## 2023-08-28 DIAGNOSIS — Z63.5 DISRUPTION OF FAMILY BY SEPARATION AND DIVORCE: ICD-10-CM

## 2023-08-28 DIAGNOSIS — Z86.39 PERSONAL HISTORY OF OTHER ENDOCRINE, NUTRITIONAL AND METABOLIC DISEASE: ICD-10-CM

## 2023-08-28 DIAGNOSIS — R39.15 URGENCY OF URINATION: ICD-10-CM

## 2023-08-28 DIAGNOSIS — N81.6 RECTOCELE: ICD-10-CM

## 2023-08-28 DIAGNOSIS — Z83.79 FAMILY HISTORY OF OTHER DISEASES OF THE DIGESTIVE SYSTEM: ICD-10-CM

## 2023-08-28 DIAGNOSIS — Z86.59 PERSONAL HISTORY OF OTHER MENTAL AND BEHAVIORAL DISORDERS: ICD-10-CM

## 2023-08-28 LAB
BILIRUB UR QL STRIP: NORMAL
CLARITY UR: CLEAR
COLLECTION METHOD: NORMAL
GLUCOSE UR-MCNC: NORMAL
HCG UR QL: 0.2 EU/DL
HGB UR QL STRIP.AUTO: NORMAL
KETONES UR-MCNC: NORMAL
LEUKOCYTE ESTERASE UR QL STRIP: NORMAL
NITRITE UR QL STRIP: NORMAL
PH UR STRIP: 5.5
PROT UR STRIP-MCNC: NORMAL
SP GR UR STRIP: 1.02

## 2023-08-28 PROCEDURE — 99204 OFFICE O/P NEW MOD 45 MIN: CPT | Mod: 25

## 2023-08-28 PROCEDURE — 51701 INSERT BLADDER CATHETER: CPT

## 2023-08-28 PROCEDURE — 99214 OFFICE O/P EST MOD 30 MIN: CPT | Mod: 25

## 2023-08-28 SDOH — SOCIAL STABILITY - SOCIAL INSECURITY: DISRUPTION OF FAMILY BY SEPARATION AND DIVORCE: Z63.5

## 2023-08-28 NOTE — PROCEDURE
[FreeTextEntry1] : A sterile straight catheterization was performed to rule out a urinary tract infection and measure postvoid residual volume, which was 20 ml.

## 2023-08-28 NOTE — HISTORY OF PRESENT ILLNESS
[FreeTextEntry1] : RAMESH BURGESS is a 43-year-old P1 presenting for evaluation of urinary incontinence. She has SIOBHAN but is mostly bothered by stress incontinence symptoms. She went to a urologist 2 years ago and had a urodynamic where she was told that she has small bladder capacity. She underwent pelvic floor electrical stimulation therapy (?) that she said helped for some time, however symptoms returned. She is bothered by having the wear pads all the time, which are irritating to her skin - she has a h/o presumed Lichen sclerosis (vulvar biopsy resulted with epidermal hyperkeratosis).   Daytime voids: 6-7  Nighttime voids: 0  Her fluid intake includes: 1-2 cups of coffee, 3 cans of seltzer, occasional orange juice, +lime/omero.

## 2023-08-28 NOTE — DISCUSSION/SUMMARY
[FreeTextEntry1] : #SIOBHAN/urinary urgency: She is mainly bothered by CAROL. We discussed etiology and management alternatives, including Kegels/PT, bulking, sling. She is unsure and would like to take some time to think about her options. She will start doing pelvic floor exercises in the meantime. We also discussed behavioral/fluid modifications for urgency symptoms, which she is amenable to initiating. She has an asymptomatic stage II rectocele. RTO in 6-8 weeks. Pt will get urology records.

## 2023-08-29 PROBLEM — Z63.5 DIVORCED: Status: ACTIVE | Noted: 2023-08-29

## 2023-08-29 LAB
APPEARANCE: CLEAR
BACTERIA: NEGATIVE /HPF
BILIRUBIN URINE: NEGATIVE
BLOOD URINE: ABNORMAL
CAST: 0 /LPF
COLOR: YELLOW
EPITHELIAL CELLS: 3 /HPF
GLUCOSE QUALITATIVE U: NEGATIVE MG/DL
KETONES URINE: NEGATIVE MG/DL
LEUKOCYTE ESTERASE URINE: NEGATIVE
MICROSCOPIC-UA: NORMAL
NITRITE URINE: NEGATIVE
PH URINE: 5.5
PROTEIN URINE: NORMAL MG/DL
RED BLOOD CELLS URINE: 1 /HPF
REVIEW: NORMAL
SPECIFIC GRAVITY URINE: 1.02
UROBILINOGEN URINE: 0.2 MG/DL
WHITE BLOOD CELLS URINE: 0 /HPF

## 2023-08-30 LAB — BACTERIA UR CULT: NORMAL

## 2023-09-08 LAB
ANION GAP SERPL CALC-SCNC: 9 MMOL/L
BUN SERPL-MCNC: 12 MG/DL
CALCIUM SERPL-MCNC: 9.3 MG/DL
CHLORIDE SERPL-SCNC: 104 MMOL/L
CHOLEST SERPL-MCNC: 187 MG/DL
CO2 SERPL-SCNC: 26 MMOL/L
CREAT SERPL-MCNC: 0.69 MG/DL
EGFR: 111 ML/MIN/1.73M2
ESTIMATED AVERAGE GLUCOSE: 120 MG/DL
GLUCOSE SERPL-MCNC: 90 MG/DL
HBA1C MFR BLD HPLC: 5.8 %
HDLC SERPL-MCNC: 77 MG/DL
LDLC SERPL CALC-MCNC: 100 MG/DL
NONHDLC SERPL-MCNC: 110 MG/DL
POTASSIUM SERPL-SCNC: 4.1 MMOL/L
SODIUM SERPL-SCNC: 139 MMOL/L
TRIGL SERPL-MCNC: 54 MG/DL
TSH SERPL-ACNC: 3.52 UIU/ML

## 2023-10-14 ENCOUNTER — APPOINTMENT (OUTPATIENT)
Dept: INTERNAL MEDICINE | Facility: CLINIC | Age: 43
End: 2023-10-14
Payer: COMMERCIAL

## 2023-10-14 VITALS
DIASTOLIC BLOOD PRESSURE: 73 MMHG | OXYGEN SATURATION: 99 % | HEIGHT: 59 IN | RESPIRATION RATE: 16 BRPM | WEIGHT: 146 LBS | BODY MASS INDEX: 29.43 KG/M2 | HEART RATE: 69 BPM | TEMPERATURE: 98.9 F | SYSTOLIC BLOOD PRESSURE: 108 MMHG

## 2023-10-14 DIAGNOSIS — R39.9 UNSPECIFIED SYMPTOMS AND SIGNS INVOLVING THE GENITOURINARY SYSTEM: ICD-10-CM

## 2023-10-14 DIAGNOSIS — Z12.31 ENCOUNTER FOR SCREENING MAMMOGRAM FOR MALIGNANT NEOPLASM OF BREAST: ICD-10-CM

## 2023-10-14 DIAGNOSIS — E03.9 HYPOTHYROIDISM, UNSPECIFIED: ICD-10-CM

## 2023-10-14 DIAGNOSIS — K62.5 HEMORRHAGE OF ANUS AND RECTUM: ICD-10-CM

## 2023-10-14 PROCEDURE — 90686 IIV4 VACC NO PRSV 0.5 ML IM: CPT

## 2023-10-14 PROCEDURE — 90471 IMMUNIZATION ADMIN: CPT

## 2023-10-14 PROCEDURE — 99396 PREV VISIT EST AGE 40-64: CPT | Mod: 25

## 2023-10-14 RX ORDER — LORATADINE 10 MG/1
10 TABLET ORAL
Qty: 90 | Refills: 3 | Status: ACTIVE | COMMUNITY
Start: 2023-10-14 | End: 1900-01-01

## 2023-10-14 RX ORDER — HALOBETASOL PROPIONATE 0.5 MG/G
0.05 CREAM TOPICAL TWICE DAILY
Qty: 1 | Refills: 3 | Status: ACTIVE | COMMUNITY
Start: 2021-12-18 | End: 1900-01-01

## 2023-10-14 RX ORDER — CETIRIZINE HYDROCHLORIDE 10 MG/1
10 TABLET, COATED ORAL
Refills: 0 | Status: DISCONTINUED | COMMUNITY
End: 2023-10-14

## 2023-10-15 PROBLEM — R39.9 LOWER URINARY TRACT SYMPTOMS (LUTS): Status: ACTIVE | Noted: 2021-04-08

## 2023-10-15 PROBLEM — K62.5 RECTAL BLEEDING: Status: ACTIVE | Noted: 2023-10-14

## 2023-10-23 ENCOUNTER — APPOINTMENT (OUTPATIENT)
Dept: UROGYNECOLOGY | Facility: CLINIC | Age: 43
End: 2023-10-23

## 2023-11-10 ENCOUNTER — APPOINTMENT (OUTPATIENT)
Dept: INTERNAL MEDICINE | Facility: CLINIC | Age: 43
End: 2023-11-10

## 2024-02-05 ENCOUNTER — APPOINTMENT (OUTPATIENT)
Dept: UROGYNECOLOGY | Facility: CLINIC | Age: 44
End: 2024-02-05
Payer: COMMERCIAL

## 2024-02-05 VITALS — SYSTOLIC BLOOD PRESSURE: 108 MMHG | OXYGEN SATURATION: 95 % | DIASTOLIC BLOOD PRESSURE: 67 MMHG | HEART RATE: 69 BPM

## 2024-02-05 DIAGNOSIS — N39.46 MIXED INCONTINENCE: ICD-10-CM

## 2024-02-05 PROCEDURE — 99214 OFFICE O/P EST MOD 30 MIN: CPT

## 2024-02-05 NOTE — HISTORY OF PRESENT ILLNESS
[FreeTextEntry1] : Pt presents for follow-up of urinary symptoms. Has stopped seltzer and cup back on caffeine. Still has leakage issues, mostly with exertion.

## 2024-02-05 NOTE — DISCUSSION/SUMMARY
[FreeTextEntry1] : Pt with persistent urinary symptoms, mainly bothered by CAROL. Reviewed management alternatives again and she is interested in urethral bulking. Will schedule UDS. RTO for Bulkamid thereafter - will call patient after UDS to discuss prior to procedure.

## 2024-03-08 ENCOUNTER — NON-APPOINTMENT (OUTPATIENT)
Age: 44
End: 2024-03-08

## 2024-03-10 ENCOUNTER — NON-APPOINTMENT (OUTPATIENT)
Age: 44
End: 2024-03-10

## 2024-03-26 ENCOUNTER — APPOINTMENT (OUTPATIENT)
Dept: UROGYNECOLOGY | Facility: CLINIC | Age: 44
End: 2024-03-26

## 2024-04-03 ENCOUNTER — APPOINTMENT (OUTPATIENT)
Dept: UROGYNECOLOGY | Facility: CLINIC | Age: 44
End: 2024-04-03

## 2024-05-21 RX ORDER — LEVOTHYROXINE SODIUM 0.05 MG/1
50 TABLET ORAL
Qty: 90 | Refills: 1 | Status: ACTIVE | COMMUNITY
Start: 2021-04-13 | End: 1900-01-01

## 2024-07-20 ENCOUNTER — APPOINTMENT (OUTPATIENT)
Dept: INTERNAL MEDICINE | Facility: CLINIC | Age: 44
End: 2024-07-20

## 2024-07-20 VITALS
BODY MASS INDEX: 29.23 KG/M2 | DIASTOLIC BLOOD PRESSURE: 76 MMHG | OXYGEN SATURATION: 99 % | WEIGHT: 145 LBS | RESPIRATION RATE: 16 BRPM | SYSTOLIC BLOOD PRESSURE: 117 MMHG | HEIGHT: 59 IN | HEART RATE: 89 BPM | TEMPERATURE: 98.3 F

## 2024-07-20 DIAGNOSIS — N63.20 UNSPECIFIED LUMP IN THE LEFT BREAST, UNSPECIFIED QUADRANT: ICD-10-CM

## 2024-07-20 DIAGNOSIS — J01.90 ACUTE SINUSITIS, UNSPECIFIED: ICD-10-CM

## 2024-07-20 PROCEDURE — 99214 OFFICE O/P EST MOD 30 MIN: CPT

## 2024-07-20 PROCEDURE — G2211 COMPLEX E/M VISIT ADD ON: CPT | Mod: NC

## 2024-07-20 RX ORDER — AMOXICILLIN AND CLAVULANATE POTASSIUM 875; 125 MG/1; MG/1
875-125 TABLET, COATED ORAL
Qty: 20 | Refills: 1 | Status: ACTIVE | COMMUNITY
Start: 2024-07-20 | End: 1900-01-01

## 2024-07-26 NOTE — REASON FOR VISIT
Hypertensive Disorder [Consultation] : a consultation visit [Hypothyroidism] : hypothyroidism [FreeTextEntry2] : Dr Guo

## 2025-07-10 NOTE — ED ADULT NURSE NOTE - ISOLATION TYPE:
"   Acute Care Surgery   Progress Note  Admit Date: 7/7/2025  HD#2  POD#3 Days Post-Op    Subjective:   Interval history:  NAEON; AF, HDS   Denies nausea, passing flatus, or having a BM. Voiding spontaneously  Reports emesis overnight   MATT Drain 30 cc serosanguineous     Home Meds:  Current Outpatient Medications   Medication Instructions    acetaminophen (TYLENOL) 1,000 mg, Oral, Every 6 hours PRN    ARIPiprazole (ABILIFY) 5 mg, Oral    gabapentin (NEURONTIN) 800 mg, 3 times daily    HYDROcodone-acetaminophen (NORCO) 5-325 mg per tablet 1 tablet, Oral, Every 8 hours PRN    ibuprofen (ADVIL,MOTRIN) 600 mg, Oral, Every 6 hours PRN    ondansetron (ZOFRAN-ODT) 4 mg, Oral, Every 8 hours PRN      Scheduled Meds:   acetaminophen  650 mg Oral Q6H    docusate sodium  50 mg Oral Daily    enoxparin  40 mg Subcutaneous Q12H    gabapentin  600 mg Oral TID    methocarbamoL  1,000 mg Intravenous Q8H    polyethylene glycol  17 g Oral Daily     Continuous Infusions:   lactated ringers   Intravenous Continuous 125 mL/hr at 07/10/25 0748 Rate Verify at 07/10/25 0748     PRN Meds:  Current Facility-Administered Medications:     morphine, 2 mg, Intravenous, Q6H PRN    ondansetron, 8 mg, Intravenous, Q4H PRN    oxyCODONE, 5 mg, Oral, Q6H PRN    oxyCODONE, 10 mg, Oral, Q6H PRN    prochlorperazine, 2.5 mg, Intravenous, Q4H PRN    sodium chloride 0.9%, 10 mL, Intravenous, PRN    Flushing PICC/Midline Protocol, , , Until Discontinued **AND** sodium chloride 0.9%, 10 mL, Intravenous, Q12H PRN     Objective:     VITAL SIGNS: 24 HR MIN & MAX LAST   Temp  Min: 98.7 °F (37.1 °C)  Max: 99.5 °F (37.5 °C)  99.1 °F (37.3 °C)   BP  Min: 116/75  Max: 137/77  125/73    Pulse  Min: 89  Max: 107  89    Resp  Min: 18  Max: 20  18    SpO2  Min: 93 %  Max: 94 %  (!) 94 %      HT: 5' 10" (177.8 cm)  WT: (!) 154.5 kg (340 lb 9.8 oz)  BMI: 48.9     Intake/output:  Intake/Output - Last 3 Shifts         07/08 0700  07/09 0659 07/09 0700  07/10 0659 07/10 " 0700  07/11 0659    P.O. 480 360     I.V. (mL/kg)   1734.4 (11.2)    IV Piggyback       Total Intake(mL/kg) 480 (3.1) 360 (2.3) 1734.4 (11.2)    Urine (mL/kg/hr) 800 (0.2)      Drains 20 30     Stool 0      Total Output 820 30     Net -340 +330 +1734.4           Unmeasured Urine Occurrence  1 x     Unmeasured Stool Occurrence 1 x 2 x             Intake/Output Summary (Last 24 hours) at 7/10/2025 0802  Last data filed at 7/10/2025 0748  Gross per 24 hour   Intake 2094.41 ml   Output --   Net 2094.41 ml           Lines/drains/airway:  Peripheral IV Single Lumen 07/07/25 0217 20 G Posterior;Proximal;Right Forearm (Active)   Site Assessment Clean;Dry;Intact 07/08/25 0305   Line Securement Device Secured with sutureless device 07/07/25 1800   Extremity Assessment Distal to IV No abnormal discoloration;No redness;No swelling;No warmth 07/07/25 1800   Line Status Infusing 07/08/25 0305   Dressing Status Clean;Dry;Intact 07/08/25 0305   Dressing Intervention Integrity maintained 07/08/25 0305   Number of days: 1       Peripheral IV Single Lumen 07/07/25 0815 18 G Left Antecubital (Active)   Site Assessment Clean;Dry;Intact 07/08/25 0305   Line Securement Device Secured with sutureless device 07/07/25 1800   Extremity Assessment Distal to IV No abnormal discoloration;No redness;No swelling;No warmth 07/07/25 1800   Line Status Saline locked 07/08/25 0305   Dressing Status Clean;Dry;Intact 07/08/25 0305   Dressing Intervention Integrity maintained 07/08/25 0305   Number of days: 0            Closed/Suction Drain 07/07/25 Medial RUQ Bulb 19 Fr. (Active)   Site Description Unable to view 07/08/25 0200   Dressing Type Gauze;Transparent (Tegaderm) 07/08/25 0200   Dressing Status Clean;Dry;Intact 07/08/25 0200   Dressing Intervention Integrity maintained 07/08/25 0200   Drainage Sanguineous 07/08/25 0200   Status To bulb suction 07/08/25 0200   Output (mL) 20 mL 07/07/25 1800   Number of days: 1       Physical examination:  Gen:  "NAD, AAOx3, answering questions appropriately  HEENT: normocephalic, atraumatic  CV: RR  Resp: NWOB  Abd: Soft, non-distended; appropriately tender to palpation along midline surgical incision site. Dressings in place; clean/dry, non-saturated.   Ext: moving all extremities spontaneously and purposefully   LDA: MATT drain within right subcutaneous abdominal wall     Labs:  Renal:  Recent Labs     07/08/25  0651 07/09/25  0623 07/10/25  0443   BUN 12.8 11.5 11.4   CREATININE 0.69* 0.66* 0.74     No results for input(s): "LACTIC" in the last 72 hours.  FENGI:  Recent Labs     07/08/25  0651 07/09/25  0623 07/10/25  0443    141 141   K 3.9 3.5 3.4*    103 101   CO2 29 26 29   CALCIUM 8.1* 8.9 8.9   MG 2.00 1.70 1.80   PHOS 2.8 2.7 2.2*   PROT  --  7.0 7.2   ALBUMIN  --  2.9* 2.9*   BILITOT  --  0.6 0.4   AST  --  16 14   ALKPHOS  --  60 63   ALT  --  17 19     Heme:  Recent Labs     07/08/25  0651 07/09/25  0623 07/10/25  0443   HGB 12.1* 12.4* 12.2*   HCT 38.9* 40.6* 40.2*    271 268     ID:  Recent Labs     07/08/25  0651 07/09/25  0623 07/10/25  0443   WBC 6.57 5.55  5.55 7.82     CBG:  No results for input(s): "GLUCOSE" in the last 72 hours.   Cardiovascular:  No results for input(s): "TROPONINI", "CKTOTAL", "CKMB", "BNP" in the last 168 hours.  I have reviewed all pertinent lab results within the past 24 hours.    Imaging:  CT Abdomen Pelvis With IV Contrast NO Oral Contrast   Final Result      Midline ventral hernia containing transverse colon and small bowel with resultant small bowel obstruction.  There is no evidence of ischemia or perforation.  There is a small amount of free fluid in the herniating sac with associated mesenteric inflammatory stranding consistent with incarceration and resultant obstruction.      [employer] concordance         Electronically signed by: All Simmons MD   Date:    07/07/2025   Time:    04:20      X-Ray Abdomen AP 1 View    (Results Pending)      I have reviewed " all pertinent imaging results/findings within the past 24 hours.    Micro/Path/Other:  Microbiology Results (last 7 days)       ** No results found for the last 168 hours. **           Pathology Results  (Last 7 days)                 07/07/25 0823  Specimen to Pathology General Surgery Final result             Assessment & Plan:   33 year old male without significant PMHx who presented to [facility] 7[facility]7 with abdominal pain associated with hernia. General surgery consulted to evaluate. Imaging with large midline umbilical ventral hernia with transverse colon and small intestine within the defect; evidence of small bowel obstruction. Presentation consistent with incarcerated, non-strangulated ventral hernia. S/p open primary ventral hernia repair 7/7.     -regular diet  - will dc mIVF once tolerating regular diet   -  Anti-emetics PRN   - Multimodal pain control   - ok to leave abdominal wound open to air   - Abdominal binder at all times   - PT/OT; encourage OOB to chair & ambulation   - DVT ppx - lovenox 60 daily  - Offer smoking cessation     Dispo: continue inpatient care       7/10/2025     The above findings, diagnostics, and treatment plan were discussed with Dr. Rony Brown who will follow with further assessments and recommendations. Please call with any questions, concerns, or clinical status changes.  This note/OR report was created with the assistance of  voice recognition software or phone  dictation.  There may be transcription errors as a result of using this technology however minimal. Effort has been made to assure accuracy of transcription but any obvious errors or omissions should be clarified with the author of the document.        Agree with above   Anti nausea meds  Abdominal binder at all times  Home soon once nausea improves    Rony Brown Jr, MD MS  Trauma Critical Care Surgery      None